# Patient Record
Sex: FEMALE | Race: WHITE | Employment: FULL TIME | URBAN - NONMETROPOLITAN AREA
[De-identification: names, ages, dates, MRNs, and addresses within clinical notes are randomized per-mention and may not be internally consistent; named-entity substitution may affect disease eponyms.]

---

## 2024-07-05 ENCOUNTER — APPOINTMENT (OUTPATIENT)
Dept: GENERAL RADIOLOGY | Age: 49
End: 2024-07-05
Payer: COMMERCIAL

## 2024-07-05 ENCOUNTER — HOSPITAL ENCOUNTER (EMERGENCY)
Age: 49
Discharge: HOME | End: 2024-07-06
Attending: EMERGENCY MEDICINE
Payer: COMMERCIAL
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
## 2024-07-05 PROCEDURE — 73090 X-RAY EXAM OF FOREARM: CPT

## 2024-07-05 PROCEDURE — 6370000000 HC RX 637 (ALT 250 FOR IP): Performed by: EMERGENCY MEDICINE

## 2024-07-05 PROCEDURE — 99283 EMERGENCY DEPT VISIT LOW MDM: CPT

## 2024-07-05 RX ORDER — AMOXICILLIN AND CLAVULANATE POTASSIUM 875; 125 MG/1; MG/1
1 TABLET, FILM COATED ORAL ONCE
Status: COMPLETED | OUTPATIENT
Start: 2024-07-05 | End: 2024-07-05

## 2024-07-05 RX ORDER — BUPROPION HYDROCHLORIDE 300 MG/1
300 TABLET ORAL DAILY
COMMUNITY
Start: 2021-07-22

## 2024-07-05 RX ORDER — ESZOPICLONE 3 MG/1
TABLET, FILM COATED ORAL DAILY
COMMUNITY

## 2024-07-05 RX ORDER — CLONAZEPAM 0.5 MG/1
TABLET ORAL
COMMUNITY

## 2024-07-05 RX ORDER — CHLORHEXIDINE GLUCONATE ORAL RINSE 1.2 MG/ML
15 SOLUTION DENTAL 2 TIMES DAILY
COMMUNITY
Start: 2023-10-16

## 2024-07-05 RX ORDER — KETOCONAZOLE 20 MG/G
CREAM TOPICAL 2 TIMES DAILY
COMMUNITY
Start: 2024-04-04

## 2024-07-05 RX ORDER — DOXEPIN HYDROCHLORIDE 10 MG/1
10 CAPSULE ORAL NIGHTLY
COMMUNITY
Start: 2021-07-22

## 2024-07-05 RX ORDER — IBUPROFEN 800 MG/1
800 TABLET ORAL ONCE
Status: COMPLETED | OUTPATIENT
Start: 2024-07-05 | End: 2024-07-05

## 2024-07-05 RX ADMIN — IBUPROFEN 800 MG: 800 TABLET, FILM COATED ORAL at 23:12

## 2024-07-05 RX ADMIN — AMOXICILLIN AND CLAVULANATE POTASSIUM 1 TABLET: 875; 125 TABLET, FILM COATED ORAL at 23:13

## 2024-07-05 ASSESSMENT — PAIN DESCRIPTION - LOCATION: LOCATION: ARM

## 2024-07-05 ASSESSMENT — PAIN DESCRIPTION - ORIENTATION: ORIENTATION: RIGHT

## 2024-07-05 ASSESSMENT — PAIN SCALES - GENERAL: PAINLEVEL_OUTOF10: 5

## 2024-07-06 VITALS
SYSTOLIC BLOOD PRESSURE: 138 MMHG | BODY MASS INDEX: 32.96 KG/M2 | HEART RATE: 77 BPM | OXYGEN SATURATION: 98 % | RESPIRATION RATE: 18 BRPM | DIASTOLIC BLOOD PRESSURE: 85 MMHG | HEIGHT: 67 IN | TEMPERATURE: 97.2 F | WEIGHT: 210 LBS

## 2024-07-06 RX ORDER — AMOXICILLIN AND CLAVULANATE POTASSIUM 875; 125 MG/1; MG/1
1 TABLET, FILM COATED ORAL 2 TIMES DAILY
Qty: 14 TABLET | Refills: 0 | Status: SHIPPED | OUTPATIENT
Start: 2024-07-06 | End: 2024-07-13

## 2024-07-06 RX ORDER — AMOXICILLIN AND CLAVULANATE POTASSIUM 875; 125 MG/1; MG/1
1 TABLET, FILM COATED ORAL 2 TIMES DAILY
Qty: 14 TABLET | Refills: 0 | Status: SHIPPED | OUTPATIENT
Start: 2024-07-06 | End: 2024-07-06

## 2024-07-06 NOTE — ED PROVIDER NOTES
eMERGENCY dEPARTMENT eNCOUnter      Pt Name: Samantha Morales  MRN: [id number]  Birthdate 1975  Date of evaluation: 7/5/2024      CHIEF COMPLAINT       Chief Complaint   Patient presents with    Animal Bite     Pt c/o dog bites to RFA, puncture wound and abrasions noted by elbow and wrist, two separate bites, dog is known to family at bedside, bite form given for [facility]         HISTORY OF PRESENT ILLNESS    Samantha Morales is a 49 y.o. female who presents with dog bite.  Patient states that she was outside and the neighbors dog came out and bit her arm during fireworks.  States immunizations and dog is up-to-date hers are up-to-date also last tetanus was within 5 years        REVIEW OF SYSTEMS       Positive dog bite negative for numbness tingling weakness or loss of function    PAST MEDICAL HISTORY    has no past medical history on file.    SURGICAL HISTORY      has no past surgical history on file.    CURRENT MEDICATIONS       Discharge Medication List as of 7/6/2024 12:36 AM        CONTINUE these medications which have NOT CHANGED    Details   buPROPion (WELLBUTRIN XL) 300 MG extended release tablet Take 1 tablet by mouth dailyHistorical Med      chlorhexidine (PERIDEX) 0.12 % solution Take 15 mLs by mouth 2 times dailyHistorical Med      doxepin (SINEQUAN) 10 MG capsule Take 1 capsule by mouth nightlyHistorical Med      ketoconazole (NIZORAL) 2 % cream Apply topically 2 times daily, Topical, 2 TIMES DAILY Starting Thu 4/4/2024, Historical Med      clonazePAM (KLONOPIN) 0.5 MG tablet Take by mouth.Historical Med      eszopiclone 3 MG TABS Take by mouth daily.Historical Med             ALLERGIES     has No Known Allergies.    FAMILY HISTORY     has no family status information on file.      family history is not on file.    SOCIAL HISTORY          PHYSICAL EXAM     INITIAL VITALS:  height is 1.702 m (5' 7\") and weight is 95.3 kg (210 lb). Her tympanic temperature is 97.2 °F (36.2 °C). Her blood  pressure is 138/85 and her pulse is 77. Her respiration is 18 and oxygen saturation is 98%.      General: Patient alert nontoxic-appearing female in no apparent distress  HEENT: Head is atraumatic  Respiratory: Patient has nonlabored respirations  Extremity: Examination of right upper extremity reveals 3 puncture wounds around the right elbow no active bleeding neurovascular and tendon function distally is intact    DIFFERENTIAL DIAGNOSIS/ MDM:     Dog bite    DIAGNOSTIC RESULTS     EKG: All EKG's are interpreted by the Emergency Department Physician who either signs or Co-signs this chart in the absence of a cardiologist.        RADIOLOGY:   I directly visualized the following  images and reviewed the radiologist interpretations:  XR RADIUS ULNA RIGHT (2 VIEWS)   Final Result   Soft tissue defect proximal forearm.               LABS:  Labs Reviewed - No data to display      EMERGENCY DEPARTMENT COURSE:   Vitals:    Vitals:    07/05/24 2242 07/06/24 0049   BP: 138/85    Pulse: 77    Resp: 16 18   Temp: 97.2 °F (36.2 °C)    TempSrc: Tympanic    SpO2: 98%    Weight: 95.3 kg (210 lb)    Height: 1.702 m (5' 7\")      -------------------------  BP: 138/85, Temp: 97.2 °F (36.2 °C), Pulse: 77, Respirations: 18    Orders Placed This Encounter   Medications    amoxicillin-clavulanate (AUGMENTIN) 875-125 MG per tablet 1 tablet     Order Specific Question:   Antimicrobial Indications     Answer:   Skin and Soft Tissue Infection    ibuprofen (ADVIL;MOTRIN) tablet 800 mg    amoxicillin-clavulanate (AUGMENTIN) 875-125 MG per tablet     Sig: Take 1 tablet by mouth 2 times daily for 7 days     Dispense:  14 tablet     Refill:  0           Re-evaluation Notes    X-ray shows no evidence of bony abnormalities patient was started antibiotics here instructed watch for early signs infection return if worse    CRITICAL CARE:   None      CONSULTS:      PROCEDURES:  None    FINAL IMPRESSION      1. Dog bite, initial encounter